# Patient Record
Sex: MALE | Race: WHITE | NOT HISPANIC OR LATINO | Employment: FULL TIME | URBAN - METROPOLITAN AREA
[De-identification: names, ages, dates, MRNs, and addresses within clinical notes are randomized per-mention and may not be internally consistent; named-entity substitution may affect disease eponyms.]

---
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
## 2022-02-11 VITALS
BODY MASS INDEX: 20.49 KG/M2 | TEMPERATURE: 98.1 F | SYSTOLIC BLOOD PRESSURE: 108 MMHG | HEIGHT: 68 IN | WEIGHT: 135.2 LBS | HEART RATE: 64 BPM | DIASTOLIC BLOOD PRESSURE: 60 MMHG

## 2022-02-12 VITALS
TEMPERATURE: 97.2 F | SYSTOLIC BLOOD PRESSURE: 114 MMHG | SYSTOLIC BLOOD PRESSURE: 130 MMHG | DIASTOLIC BLOOD PRESSURE: 80 MMHG | HEART RATE: 80 BPM | BODY MASS INDEX: 21.07 KG/M2 | WEIGHT: 139 LBS | BODY MASS INDEX: 21.59 KG/M2 | HEIGHT: 68 IN | DIASTOLIC BLOOD PRESSURE: 82 MMHG | HEART RATE: 80 BPM | WEIGHT: 142 LBS

## 2022-02-12 VITALS
HEART RATE: 64 BPM | SYSTOLIC BLOOD PRESSURE: 126 MMHG | BODY MASS INDEX: 19.4 KG/M2 | WEIGHT: 123.6 LBS | TEMPERATURE: 97.6 F | DIASTOLIC BLOOD PRESSURE: 72 MMHG | HEIGHT: 67 IN

## 2022-02-12 VITALS
SYSTOLIC BLOOD PRESSURE: 106 MMHG | HEART RATE: 52 BPM | TEMPERATURE: 97.1 F | WEIGHT: 138.8 LBS | OXYGEN SATURATION: 99 % | DIASTOLIC BLOOD PRESSURE: 70 MMHG | BODY MASS INDEX: 21.1 KG/M2

## 2022-02-12 VITALS
WEIGHT: 128 LBS | TEMPERATURE: 98.3 F | WEIGHT: 126.6 LBS | HEIGHT: 68 IN | SYSTOLIC BLOOD PRESSURE: 112 MMHG | HEART RATE: 60 BPM | DIASTOLIC BLOOD PRESSURE: 70 MMHG | DIASTOLIC BLOOD PRESSURE: 74 MMHG | HEIGHT: 68 IN | HEART RATE: 64 BPM | BODY MASS INDEX: 19.19 KG/M2 | SYSTOLIC BLOOD PRESSURE: 100 MMHG | BODY MASS INDEX: 19.4 KG/M2

## 2022-02-12 VITALS
OXYGEN SATURATION: 95 % | WEIGHT: 134.6 LBS | DIASTOLIC BLOOD PRESSURE: 58 MMHG | SYSTOLIC BLOOD PRESSURE: 116 MMHG | HEART RATE: 126 BPM | TEMPERATURE: 99.8 F

## 2022-02-12 VITALS — DIASTOLIC BLOOD PRESSURE: 62 MMHG | SYSTOLIC BLOOD PRESSURE: 116 MMHG | HEART RATE: 76 BPM | TEMPERATURE: 97.8 F

## 2022-02-15 NOTE — PROGRESS NOTES
Chief Complaint    pt here for upper back pain, shoulder pain, says worse with movement, pain started on wednesday, he went to chiro yesturday, no improvment, no known injury  History of Present Illness      Chief complaint as above reviewed and confirmed with patient. and parents.  Pt presents to the clinic with concerns re: upper back, shoulder discomfort.  pain had insidious onset.  It started about 5 days ago. He went to the chiropractor and had temporary improvement but then worsened again.  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  He also lifts up to 40 lbs repeatively.  There was not an injury or incident at work but he points out that he had been off work for 2 weeks due to thumb injury and 1 day prior to onset of pain he had returned to work.  He denies any tingling, numbness or weakness of his arms or legs.  He denies any HA. he has tried OTC naproxen 2 tablets bid.  Has tried ice, no heat.   Review of Systems      Review of systems is negative with the exception of those noted in HPI          Physical Exam   Vitals & Measurements    T: 97.1   F (Tympanic)  HR: 52(Peripheral)  BP: 106/70  SpO2: 99%     WT: 138.8 lb       pt is in no acute distress and appears well      no midline cervical pain to palpation       there is mild pain of the perivertebral muscles of the C spine and upper trapezius with palpation on the L.  Pain also along the medial scapular boarder on the L.      There is pain with AROM in flexion, extension, lateral flexion and rotation B but full range.       muscular strength sensation and DTR are symmetrical and WNL for upper ext B.       no midline T or L spine pain.  no pain with palpation of the lumbar perivertebral  muscles.   Assessment/Plan       Upper back pain (M54.9)        continue naproxen.  will do short course of robaxin for sx.  ice, heat, ROM exercises, PT referral.  prn         Orders:          97[address] office outpatient visit  15 minutes (Charge), Quantity: 1, Upper back pain          Physical Therapy (Request), Priority: Soon, Upper back pain                Orders:         methocarbamol, 1 tab(s) ( 500 mg ), PO, QID, # 28 tab(s), 0 Refill(s), Type: Maintenance, Pharmacy: [id number], 1 tab(s) po qid,x7 day(s), (Ordered)  Patient Information     Name:AFRICA IVAN      Address:      [address] [address]     Sex:Male     YOB: 2000     [phone]     Emergency Contact:WESLEY IVAN     MRN:[id number]     FIN:[id number]     Location:[facility]     Date of Service:06/23/2018      Primary Care Physician:       Harper PANTOJA [place], [phone]      Attending Physician:       Alyson Espinoza PA-C, [phone]  Problem List/Past Medical History    Ongoing     ADHD (Attention Deficit Hyperactivity Disorder)     Closed fracture of right little finger       Comments: per [facility]    Historical     No qualifying data  Procedure/Surgical History     Hernia repair (06/2007)             Comments: right     Tonsillectomy (04/01/2007)        Medications     Robaxin 500 mg oral tablet: 500 mg, 1 tab(s), PO, QID, for 7 day(s), 28 tab(s), 0 Refill(s).          Allergies    sulfa drug  Social History    Smoking Status - 06/23/2018     Never smoker     Tobacco      Current, Household tobacco concerns: No., 08/16/2010  Family History    Alive and well: Mother, Father and Sister.  Immunizations      Vaccine Date Status      Hep A 09/29/2015 Recorded      meningococcal conjugate vaccine 10/15/2014 Recorded      influenza virus vaccine, inactivated 10/15/2014 Recorded      Hep A 10/15/2014 Recorded      tetanus/diphth/pertuss (Tdap) adult/adol 08/19/2013 Recorded      IPV 09/02/2005 Recorded      Hep B 09/02/2005 Recorded      MMR (measles/mumps/rubella) 09/02/2005 Recorded      MMR (measles/mumps/rubella) 09/02/2005 Recorded      hepatitis B pediatric vaccine  09/02/2005 Recorded      DTaP 09/02/2005 Recorded      MMR (measles/mumps/rubella) 10/17/2002 Recorded      MMR (measles/mumps/rubella) 10/17/2002 Recorded      varicella 10/17/2002 Recorded      IPV 07/15/2002 Recorded      pneumococcal (PCV7) 03/06/2002 Recorded      pneumococcal (PCV7) 03/06/2002 Recorded      DTaP 03/06/2002 Recorded      IPV 2001 Recorded      Hep B-Hib 2001 Recorded      Hep B-Hib 2001 Recorded      pneumococcal (PCV7) 2001 Recorded      pneumococcal (PCV7) 2001 Recorded      DTaP 2001 Recorded      IPV 2001 Recorded      Hep B-Hib 2001 Recorded      Hep B-Hib 2001 Recorded      DTaP 2001 Recorded

## 2022-02-15 NOTE — NURSING NOTE
Comprehensive Intake Entered On:  6/24/2020 2:27 PM CDT    Performed On:  6/24/2020 2:22 PM CDT by Heydi Garces CMA               Summary   Chief Complaint :   sore on left middle finger, blister under cut, cut finger last week, possibly cut on piece of metal   Menstrual Status :   N/A   Weight Measured :   139 lb(Converted to: 139 lb 0 oz, 63.05 kg)    Height Measured :   68 in(Converted to: 5 ft 8 in, 172.72 cm)    Body Mass Index :   21.13 kg/m2   Body Surface Area :   1.74 m2   Systolic Blood Pressure :   130 mmHg   Diastolic Blood Pressure :   80 mmHg   Mean Arterial Pressure :   97 mmHg   Peripheral Pulse Rate :   80 bpm   BP Method :   Manual   HR Method :   Manual   Heydi Garces CMA - 6/24/2020 2:22 PM CDT   Health Status   Allergies Verified? :   Yes   Medication History Verified? :   Yes   Immunizations Current :   Yes   Medical History Verified? :   Yes   Pre-Visit Planning Status :   Completed   Tobacco Use? :   Never smoker   Heydi Garces CMA - 6/24/2020 2:22 PM CDT   Consents   Consent for Immunization Exchange :   Consent Granted   Consent for Immunizations to Providers :   Consent Granted   Heydi Garces CMA - 6/24/2020 2:22 PM CDT   Meds / Allergies   (As Of: 6/24/2020 2:27:23 PM CDT)   Allergies (Active)   sulfa drug  Estimated Onset Date:   Unspecified ; Created By:   Noreen Alatorre; Reaction Status:   Active ; Category:   Drug ; Substance:   sulfa drug ; Type:   Allergy ; Updated By:   Noreen Alatorre; Reviewed Date:   6/24/2020 2:25 PM CDT        Medication List   (As Of: 6/24/2020 2:27:23 PM CDT)   Prescription/Discharge Order    Miscellaneous Rx Supply  :   Miscellaneous Rx Supply ; Status:   Processing ; Ordered As Mnemonic:   crutches ; Ordering Provider:   Mookie Acosta MD; Action Display:   Complete ; Catalog Code:   Miscellaneous Rx Supply ; Order Dt/Tm:   6/24/2020 2:25:21 PM CDT            Home Meds    amphetamine-dextroamphetamine  :   amphetamine-dextroamphetamine ; Status:    Documented ; Ordered As Mnemonic:   Adderall XR 30 mg oral capsule, extended release ; Simple Display Line:   30 mg, 1 cap(s), Oral, qam, 0 Refill(s) ; Catalog Code:   amphetamine-dextroamphetamine ; Order Dt/Tm:   4/19/2019 1:45:04 PM CDT            ID Risk Screen   Recent Travel History :   No recent travel   Family Member Travel History :   No recent travel   Other Exposure to Infectious Disease :   Unknown   Heydi Garces CMA - 6/24/2020 2:22 PM CDT

## 2022-02-15 NOTE — PROGRESS NOTES
Patient:   AFRICA IVAN            MRN: [id number]            FIN: [id number]               Age:   17 years     Sex:  Male     :  2000   Associated Diagnoses:   Pneumonia   Author:   Arturo Lange PA-C      Visit Information      Date of Service: 2018 02:19 pm  Performing Location: [facility]  Encounter#: [id number]      Primary Care Provider (PCP):  Jnúior Saleem MD    NPI# [id number]   Visit type:  New symptom.    Source of history:  Self, Medical record.    History limitation:  None.       Chief Complaint   2018 2:23 PM CST    cough, unable to get sleep last night, phlegm x 2 weeks on and off        History of Present Illness             The patient presents with cough.  The cough is described as hacking and productive green sputum.  The cough is episodic, fluctuates in intensity and is worsening.  The cough has lasted for 2 week(s).  The context of the cough: occurred in association with illness.  Associated symptoms consist of nasal congestion, denies fever, denies shortness of breath and denies sore throat.        Review of Systems   Eye:  Negative.    Ear/Nose/Mouth/Throat:  Negative except as documented in history of present illness.    Respiratory:  Negative except as documented in history of present illness.             Health Status   Allergies:    Allergic Reactions (All)  Severity Not Documented  Sulfa drug (No reactions were documented)   Medications:  (Selected)   Prescriptions  Prescribed  Zithromax 250 mg oral tablet: 1 packet(s), PO, Once, Instructions: as directed on package labeling. Two tablets po on day one, then one tablet daily x four days, # 6 tab(s), 0 Refill(s), Type: Soft Stop, Pharmacy: [facility] #[id number], 1 packet(s) po once,Instr:as directed on pac...  Documented Medications  Documented  Adderall 30 mg oral tablet: 1 tab(s) ( 30 mg ), po, daily, 0 Refill(s), Type: Maintenance   Problem list:    All Problems  ADHD (Attention Deficit  Hyperactivity Disorder) / ICD-9-.01 / Confirmed  Closed fracture of right little finger / SNOMED CT 712157252068408 / Confirmed      Histories   Past Medical History:    Active  Closed fracture of right little finger (386664873242476): Onset on 5/26/2016 at 15 years.  Comments:  7/5/2016 CDT 2:48 PM CDT - [facility]  ADHD (Attention Deficit Hyperactivity Disorder) (314.01)   Family History:    Alive and well  Mother (Karina)  Father (Mick)  Sister     Procedure history:    Hernia repair ([id number]) in the month of 6/2007 at 6 Years.  Comments:  6/3/2016 12:19 PM - Shabana Be  right  Tonsillectomy ([id number]) on 4/1/2007 at 6 Years.   Social History:        Tobacco Assessment            Current, Household tobacco concerns: No.        Physical Examination   Vital Signs   1/17/2018 2:23 PM CST Temperature Tympanic 99.8 DegF    Peripheral Pulse Rate 126 bpm  HI    Pulse Site Radial artery    HR Method Manual    Systolic Blood Pressure 116 mmHg    Diastolic Blood Pressure 58 mmHg    Mean Arterial Pressure 77 mmHg    BP Site Left arm    BP Method Manual    Oxygen Saturation 95 %      Measurements from flowsheet : Measurements   1/17/2018 2:23 PM CST Height Measured - Standard 679 in    Weight Measured - Standard 134.6 lb    BSA 5.4 m2    Body Mass Index 0.21 kg/m2    Body Mass Index Percentile 0.00      General:  Alert and oriented, No acute distress.    Eye:  Pupils are equal, round and reactive to light, Extraocular movements are intact, Normal conjunctiva.    HENT:  Normocephalic, Oral mucosa is moist, No pharyngeal erythema.    Neck:  Supple, Non-tender, No lymphadenopathy.    Respiratory:  Lungs are clear to auscultation, Respirations are non-labored, Breath sounds are equal.    Cardiovascular:  Normal rate, Regular rhythm, No murmur.    Psychiatric:  Cooperative, Appropriate mood & affect.       Impression and Plan   Diagnosis     Pneumonia (COR59-RO J18.9).     Patient  Instructions:       Counseled: Patient, Regarding diagnosis, Regarding treatment, Regarding medications, Regarding activity, Verbalized understanding.    Orders     Orders (Selected)   Prescriptions  Prescribed  Zithromax 250 mg oral tablet: 1 packet(s), PO, Once, Instructions: as directed on package labeling. Two tablets po on day one, then one tablet daily x four days, # 6 tab(s), 0 Refill(s), Type: Soft Stop, Pharmacy: [facility] #[id number], 1 packet(s) po once,Instr:as directed on pac....     Take medicine as prescribed, side effects discussed.  Tylenol/ibuprofen for fever and discomfort.  Push fluids.  RTC if not improving in 36-48 hours, prior if concerns as we have discussed.

## 2022-02-15 NOTE — PROGRESS NOTES
Patient:   AFRICA IVAN            MRN: [id number]            FIN: [id number]               Age:   16 years     Sex:  Male     :  2000   Associated Diagnoses:   Conjunctivitis   Author:   Júnior Saleem MD      Chief Complaint   2017 9:31 AM CDT    c/o red, itchy, crusty bilateral eyes x Monday     Chief complaint and symptoms noted above confirmed with patient.      History of Present Illness             The patient presents with eye discharge.  The eye discharge is located in both eyes.  The eye discharge is described as a moderate amount.  The severity of the eye discharge is moderate.  The eye discharge is constant.  The eye discharge has lasted for 3 day(s).  Exacerbating factors consist of none.  Relieving factors consist of none.  Associated symptoms consist of none.        Review of Systems   Constitutional:  Negative.    Eye:  Negative except as documented in history of present illness.    Ear/Nose/Mouth/Throat:  Negative.    Respiratory:  Negative.    Cardiovascular:  Negative.       Health Status   Allergies:    Allergic Reactions (Selected)  Severity Not Documented  Sulfa drug (No reactions were documented)   Medications:  (Selected)   Documented Medications  Documented  Adderall 30 mg oral tablet: 1 tab(s) ( 30 mg ), po, daily, 0 Refill(s), Type: Maintenance   Problem list:    All Problems  ADHD (Attention Deficit Hyperactivity Disorder) / ICD-9-.01 / Confirmed  Closed fracture of right little finger / SNOMED CT 974078137627248 / Confirmed      Histories   Past Medical History:    Active  Closed fracture of right little finger (SNOMED CT 728960930806091): Onset on 2016 at 15 years.  Comments:  2016 CDT 2:48 PM CDT - Barbara Sandoval CMA [facility]  ADHD (Attention Deficit Hyperactivity Disorder) (ICD-9-.01)      Physical Examination   Vital Signs   2017 9:31 AM CDT Temperature Tympanic 97.6 DegF  LOW    Peripheral Pulse Rate 64 bpm    Pulse Site  Radial artery    HR Method Manual    Systolic Blood Pressure 126 mmHg    Diastolic Blood Pressure 72 mmHg    Mean Arterial Pressure 90 mmHg    BP Site Left arm    BP Method Manual      Measurements from flowsheet : Measurements   4/12/2017 9:31 AM CDT Height Measured - Standard 67 in    Weight Measured - Standard 123.6 lb    BSA 1.63 m2    Body Mass Index 19.36 kg/m2    Body Mass Index Percentile 36.07      General:  Alert and oriented, No acute distress.    Eye:  Pupils are equal, round and reactive to light, Extraocular movements are intact.         Conjunctiva: With conjunctivitis.    HENT:  Normocephalic.    Neck:  Supple.    Respiratory:  Lungs are clear to auscultation.       Impression and Plan   Diagnosis     Conjunctivitis (EMU82-ZW H10.33).     Course:  Worsening.    Orders     Orders   Pharmacy:  ciprofloxacin 0.3% ophthalmic solution (Prescribe): 2 drop(s), Both eyes, q4hr, x 7 day(s), # 5 mL, 0 Refill(s), Type: Maintenance, Pharmacy: [facility] #[id number], 2 drop(s) both eyes q4 hrs,x7 day(s).     Orders     F/U  if not improving, sooner if getting worse.

## 2022-02-15 NOTE — NURSING NOTE
Call Adis Collins  to get Verbal permission to see and treat patient for wrist pain 4/19/19 . Ok to do X-ray if CHT finds necessary.

## 2022-02-15 NOTE — PROGRESS NOTES
Chief Complaint    Concerns with sores behind both knees and L axillary - noticed about 2-3 weeks. Had been getting better but not going away. Axillary sore has drained and getting worse. Has [place] last weekend.  History of Present Illness      Patient is an 19-year-old male who comes in with concerns about a sore in his left armpit which is getting worse.       On June 24, which is 2-1/2 to 3 weeks ago, the patient was seen for an infection in his left middle finger.  He had cut his finger and was placed on an antibiotic, dicloxacillin.       Shortly after that he noticed small bumps on the back of his knees and now has 3 bumps in his left armpit.  1 of the areas in his left armpit is increasingly painful and is itching.  It did get some clear drainage out of it recently.  He used the triple antibiotic cream on it a couple of times.       He swam in a lake about a week ago.       No hot tub exposure.       No fevers, joint aches.       No lesions in the groin area.       Patient also reports that he has some small bumps on his skin of his lower abdomen.  He saw a dermatologist and was placed on an oral steroid.  It worked for the first couple doses but then did not really seem to work anymore.  He has completed the steroid pills and is wondering if he should do more steroids or if there is something else to do for the bumps on his skin.  They are not itchy.  Review of Systems      Negative except as listed in HPI.  Physical Exam   Vitals & Measurements    T: 97.2   F (Tympanic)  HR: 80(Peripheral)  BP: 114/82     WT: 142 lb       Vitals are noted and within normal limits.      In general he is alert, oriented, and in no acute distress.       Left axilla with 2 areas of swelling and erythema.  They are oval-shaped and mildly swollen.  One area has a small pustule in the center.  They are approximately 1 cm by half a centimeter in size.       No lesions currently behind left knee.  One lesion behind  the right knee which is approximately half a centimeter by half a centimeter and erythematous.       Lower abdomen with multiple small 1 mm erythematous areas which appear to be at the base of hair follicles.  Assessment/Plan       Dermatitis (L30.9)         for the dermatitis on his skin, advised that we would likely not do oral steroid long term and that he should follow up with his dermatologist for further guidance.         recommended taking vitamin D 2000 IU daily to help overall with his skin and immune system given both of his complaints today.  offered testing for his vitamin D level but he felt comfortable just taking a safe adult daily dose.                Skin infection (L08.9)         For the axillary infection we will go with an oral antibiotic.  I chose cephalexin 3 times a day for 10 days for the skin infection because he is allergic to sulfa antibiotics.  I did recommend that he take a probiotic like Culturelle or something that includes lactobacillus and bifida back to her once daily for at least 3 weeks once he has completed the antibiotics.         Ordered:          cephalexin, = 1 cap(s) ( 500 mg ), Oral, tid, x 10 day(s), # 30 cap(s), 0 Refill(s), Type: Acute, Pharmacy: [facility] , 1 cap(s) Oral tid,x10 day(s), 68, in, 06/24/20 14:22:00 CDT, Height Measured, 142, lb, 07/10/20 9:48:00 CDT, Weight Me..., (Ordered)           Patient Information     Name:AFRICA IVAN      Address:      [address] [id number]     Sex:Male     YOB: 2000     [phone]     Emergency Contact:WESLEY IVAN     MRN:[id number]     FIN:[id number]     Location:[facility]     Date of Service:07/10/2020      Primary Care Physician:       Júnior Saleem MD, [phone]      Attending Physician:       Lucinda Silva MD, [phone]  Problem List/Past Medical History    Ongoing     ADHD (Attention Deficit Hyperactivity  Disorder)     Closed fracture of right little finger       Comments: per [facility]    Historical     No qualifying data  Procedure/Surgical History     Hernia repair (06.2007)      Comments: right.     Tonsillectomy (04/01/2007)  Medications    Adderall XR 30 mg oral capsule, extended release, 30 mg= 1 cap(s), Oral, qam,   Still taking, not as prescribed: takes every once and a while    cephalexin 500 mg oral capsule, 500 mg= 1 cap(s), Oral, tid  Allergies    sulfa drug  Social History    Smoking Status - 07/10/2020     Never smoker     Tobacco      Current, Household tobacco concerns: No., 08/16/2010  Family History    Alive and well: Mother, Father and Sister.  Immunizations      Vaccine Date Status          human papillomavirus vaccine 07/21/2017 Recorded          Hep A 09/29/2015 Recorded          Hep A 10/15/2014 Recorded          meningococcal conjugate vaccine 10/15/2014 Recorded          influenza virus vaccine, inactivated 10/15/2014 Recorded          tetanus/diphth/pertuss (Tdap) adult/adol 08/19/2013 Recorded          varicella 08/19/2013 Recorded          Hep B 09/02/2005 Recorded          MMR (measles/mumps/rubella) 09/02/2005 Recorded          MMR (measles/mumps/rubella) 09/02/2005 Recorded          IPV 09/02/2005 Recorded          DTaP 09/02/2005 Recorded          hepatitis B pediatric vaccine 09/02/2005 Recorded          MMR (measles/mumps/rubella) 10/17/2002 Recorded          MMR (measles/mumps/rubella) 10/17/2002 Recorded          varicella 10/17/2002 Recorded          IPV 07/15/2002 Recorded          DTaP 03/06/2002 Recorded          pneumococcal (PCV7) 03/06/2002 Recorded          pneumococcal (PCV7) 03/06/2002 Recorded          Hep B-Hib 2001 Recorded          Hep B-Hib 2001 Recorded          IPV 2001 Recorded          DTaP 2001 Recorded          pneumococcal (PCV7) 2001 Recorded          pneumococcal (PCV7) 2001 Recorded          Hep B-Hib 2001  Recorded          Hep B-Hib 2001 Recorded          IPV 2001 Recorded          DTaP 2001 Recorded

## 2022-02-15 NOTE — NURSING NOTE
Comprehensive Intake Entered On:  7/10/2020 9:53 AM CDT    Performed On:  7/10/2020 9:48 AM CDT by Rosie Lira CMA               Summary   Chief Complaint :   Concerns with sores behind both knees and L axillary - noticed about 2-3 weeks. Had been getting better but not going away. Axillary sore has drained and getting worse. Has been swimming [place] last weekend.    Menstrual Status :   N/A   Weight Measured :   142 lb(Converted to: 142 lb 0 oz, 64.41 kg)    Systolic Blood Pressure :   114 mmHg   Diastolic Blood Pressure :   82 mmHg (HI)    Mean Arterial Pressure :   93 mmHg   Peripheral Pulse Rate :   80 bpm   BP Site :   Right arm   Pulse Site :   Radial artery   BP Method :   Manual   HR Method :   Manual   Temperature Tympanic :   97.2 DegF(Converted to: 36.2 DegC)  (LOW)    Rosie Lira CMA - 7/10/2020 9:48 AM CDT   Health Status   Allergies Verified? :   Yes   Medication History Verified? :   Yes   Immunizations Current :   Yes   Pre-Visit Planning Status :   Not completed   Tobacco Use? :   Never smoker   Rosie Lira CMA - 7/10/2020 9:48 AM CDT   Meds / Allergies   (As Of: 7/10/2020 9:53:43 AM CDT)   Allergies (Active)   sulfa drug  Estimated Onset Date:   Unspecified ; Created By:   Noreen Alatorre; Reaction Status:   Active ; Category:   Drug ; Substance:   sulfa drug ; Type:   Allergy ; Updated By:   Noreen Alatorre; Reviewed Date:   6/24/2020 2:32 PM CDT        Medication List   (As Of: 7/10/2020 9:53:43 AM CDT)   Home Meds    amphetamine-dextroamphetamine  :   amphetamine-dextroamphetamine ; Status:   Documented ; Ordered As Mnemonic:   Adderall XR 30 mg oral capsule, extended release ; Simple Display Line:   30 mg, 1 cap(s), Oral, qam, 0 Refill(s) ; Catalog Code:   amphetamine-dextroamphetamine ; Order Dt/Tm:   4/19/2019 1:45:04 PM CDT            ID Risk Screen   Recent Travel History :   No recent travel   Family Member Travel History :   No recent travel   Other Exposure to Infectious  Disease :   Unknown   Rosie Lira CMA - 7/10/2020 9:48 AM CDT

## 2022-02-15 NOTE — PROGRESS NOTES
Patient:   AFRICA IVAN            MRN: [id number]            FIN: [id number]               Age:   17 years     Sex:  Male     :  2000   Associated Diagnoses:   None   Author:   Arturo Lange PA-C      Visit Information      Date of Service: 2018 03:40 pm  Performing Location: [facility]  Encounter#: [id number]      Primary Care Provider (PCP):  Harper PANTOJA, Júnior    NPI# [id number]      Referring Provider:  Saw Bailey MD    NPI# [id number]      Chief Complaint   2018 3:47 PM CDT    f/u L thumb      History of Present Illness   [place] presents to clinic today for a follow up regarding left thumb pain.  Patient presented last week after his left thumb came in contact with a basketball, he assumed he jammed it due to pain.  Xrays of the thumb last week showed no acute fracture.  Patient sent home with brace and recommended Tylenol & Ibuprofen regimine for pain control.  Today patient presents with slight pain (4/10) but describes shooting pain in left wrist and tingling sensation in left thumb.  He says the pain had gone away over the week but was most noticeable today.  Describes pain is worst when carrying his books with left hand and he describes difficulty driving.  History provided by patient.  Mom present for the appointment today as well.          Review of Systems   Constitutional:  Negative.    Musculoskeletal:  Joint pain, left thumb, pain radiates into wrist.    Integumentary:  Negative.       Health Status   Allergies:    Allergic Reactions (Selected)  Severity Not Documented  Sulfa drug (No reactions were documented)   Medications:  (Selected)   Documented Medications  Documented  Adderall 30 mg oral tablet: 1 tab(s) ( 30 mg ), po, daily, 0 Refill(s), Type: Maintenance,    Medications          *denotes recorded medication          *Adderall 30 mg oral tablet: 30 mg, 1 tab(s), po, daily, 0 Refill(s).     Problem list:    All Problems  ADHD (Attention Deficit  Hyperactivity Disorder) / ICD-9-.01 / Confirmed  Closed fracture of right little finger / SNOMED CT 895425197230340 / Confirmed      Histories   Past Medical History:    Active  Closed fracture of right little finger ([id number]): Onset on 5/26/2016 at 15 years.  Comments:  7/5/2016 CDT 2:48 PM CDT - Jaime MEDEIROS Barbaratracy ferrari [facility]  ADHD (Attention Deficit Hyperactivity Disorder) (314.01)   Family History:    Alive and well  Mother (Karina)  Father (Mick)  Sister     Procedure history:    Hernia repair ([id number]) in the month of 6/2007 at 6 Years.  Comments:  6/3/2016 12:19 PM - Shabana Be  right  Tonsillectomy ([id number]) on 4/1/2007 at 6 Years.   Social History:        Tobacco Assessment            Current, Household tobacco concerns: No.        Physical Examination   Vital Signs   5/23/2018 3:47 PM CDT Temperature Tympanic 98.3 DegF    Peripheral Pulse Rate 64 bpm    Pulse Site Radial artery    HR Method Manual    Systolic Blood Pressure 112 mmHg    Diastolic Blood Pressure 74 mmHg    Mean Arterial Pressure 87 mmHg    BP Site Left arm    BP Method Manual      Measurements from flowsheet : Measurements   5/23/2018 3:47 PM CDT Height Measured - Standard 68 in    Weight Measured - Standard 128 lb    BSA 1.67 m2    Body Mass Index 19.46 kg/m2    Body Mass Index Percentile 26.66      General:  Alert and oriented, No acute distress.    Cardiovascular:          Arterial pulses: Left, Radial, 2+.         Capillary refill: Left, Upper extremity, Within normal limits.    Musculoskeletal:       Upper extremity exam: Fingers ( Left, First finger, Tenderness, Range of motion ( Flexion, Restricted by pain ) ).    Integumentary:  Warm, Dry, No rash.       Impression and Plan   Plan:  Discussed two options with patient.  Option 1: Observation x1 week with activity restriction and NSAIDS & Tylenol for pain.  Option 2: MRI.  Patient and mother in agreement to observe the thumb for one week and discontinue  use of brace prior to getting MRI.  .    Note composed by PANDA Mena. History confirmed and exam performed by Arturo Lange PA-C.

## 2022-02-15 NOTE — PROGRESS NOTES
Patient:   AFRICA IVAN            MRN: [id number]            FIN: [id number]               Age:   17 years     Sex:  Male     :  2000   Associated Diagnoses:   Pain of left thumb   Author:   Arturo Lange PA-C      Visit Information      Date of Service: 2018 12:59 pm  Performing Location: [facility]  Encounter#: [id number]      Primary Care Provider (PCP):  Júnior Saleem MD    NPI# [id number]      Referring Provider:  Arturo Lange PA-C    NPI# [id number]      Chief Complaint   2018 1:03 PM CDT    Left thumb pain, swollen- jammed in gym yesterday with basketball      History of Present Illness   [place] presents to clinic today for evaluation of left thumb pain.  While playing basketball yesterday, a ball came flying across the gym so he threw his hand in front of his face for protection and his thumb came in contact with the ball.  Patient believes he may have jammed his thumb.  He immediately felt pain to his left thumb but denies any further injury.  Last evening he tried ice but it offered no relief. This morning he took 1 Aleve which offered no relief.   Patient denies pain over scaphoid, at wrist or at elbow.         Review of Systems   Constitutional:  Negative.    Eye:  Negative.    Ear/Nose/Mouth/Throat:  Negative.    Respiratory:  Negative.    Cardiovascular:  Negative.    Musculoskeletal:  Joint pain, tenderness at base of left thumb, No trauma.       Health Status   Allergies:    Allergic Reactions (Selected)  Severity Not Documented  Sulfa drug (No reactions were documented)   Medications:  (Selected)   Documented Medications  Documented  Adderall 30 mg oral tablet: 1 tab(s) ( 30 mg ), po, daily, 0 Refill(s), Type: Maintenance,    Medications          *denotes recorded medication          *Adderall 30 mg oral tablet: 30 mg, 1 tab(s), po, daily, 0 Refill(s).     Problem list:    All Problems  ADHD (Attention Deficit Hyperactivity Disorder) / ICD-9-.01 /  Confirmed  Closed fracture of right little finger / SNOMED CT 126780367403606 / Confirmed      Histories   Past Medical History:    Active  Closed fracture of right little finger ([id number]): Onset on 5/26/2016 at 15 years.  Comments:  7/5/2016 CDT 2:48 PM CDT - Barbara Sandoval CMA  [facility]  ADHD (Attention Deficit Hyperactivity Disorder) (314.01)   Family History:    Alive and well  Mother (Karina)  Father (Mick)  Sister     Procedure history:    Hernia repair ([id number]) in the month of 6/2007 at 6 Years.  Comments:  6/3/2016 12:19 PM - Shabana Be  right  Tonsillectomy ([id number]) on 4/1/2007 at 6 Years.   Social History:        Tobacco Assessment            Current, Household tobacco concerns: No.        Physical Examination   Vital Signs   5/16/2018 1:03 PM CDT Peripheral Pulse Rate 60 bpm    Pulse Site Radial artery    HR Method Manual    Systolic Blood Pressure 100 mmHg    Diastolic Blood Pressure 70 mmHg    Mean Arterial Pressure 80 mmHg    BP Site Right arm    BP Method Manual      Measurements from flowsheet : Measurements   5/16/2018 1:03 PM CDT Height Measured - Standard 68 in    Weight Measured - Standard 126.6 lb    BSA 1.66 m2    Body Mass Index 19.25 kg/m2    Body Mass Index Percentile 23.61      General:  Alert and oriented, No acute distress.    Cardiovascular:          Arterial pulses: Left, Radial, 2+.         Capillary refill: Left, Upper extremity, Within normal limits.    Musculoskeletal:       Upper extremity exam: Fingers ( Left, First finger, Metacarpal phalangeal, Tenderness, Range of motion, limited flexion ).       Review / Management   Radiology results   X-ray, Xray appears negative for acute fracture or dislocation.  Will contact patient and family if final report shows fracture/dislocation.      Impression and Plan   Diagnosis     Pain of left thumb (HPI46-MJ M79.645).     Plan:  Continue splint for  1 week(s).    Patient Instructions:       Counseled: For pain  control, patient instructed to take Aleve 200mg every 12 hours and Tylenol 1000mg every 6hrs up to 4g per day.  Ice every couple hours.  Return in 1 week for re-evaluation.  Discussed possible MRI if pain not improved.  .    Called and discussed treatment and plan with father. Note composed by PANDA Mena. History confirmed and exam performed by Arturo Lange PA-C.

## 2022-02-15 NOTE — PROGRESS NOTES
Patient:   AFRICA IVAN            MRN: [id number]            FIN: [id number]               Age:   19 years     Sex:  Male     :  2000   Associated Diagnoses:   Sprain of left foot; Left foot pain   Author:   Mookie Acosta MD      Visit Information      Date of Service: 2019 05:02 pm  Performing Location: [facility]  Encounter#: [id number]      Primary Care Provider (PCP):  Júnior Saleem MD    NPI# [id number]      Referring Provider:  Mookie Acosta MD    NPI# [id number]      Chief Complaint   2019 5:19 PM CDT    Left ankle pain      History of Present Illness             The patient presents with pain.  The symptom(s) is described as pain and swelling.  The location of symptom(s) is the left and foot.  The severity of the symptom(s) is moderate.  The timing/course of symptom(s) is constant.  The symptom(s) has lasted for 12 hour(s).  Radiation of pain: none.  The context of the symptom(s): occurred during sports and no overt trauma to the area .  Exacerbating factors consist of movement and walking.  Relieving factors consist of immobilization.  Associated symptoms consist of decreased range of motion, unable to bear weight and  these are secondary to pain.        Review of Systems   Negative, except as stated in HPI       Health Status   Allergies:    Allergic Reactions (Selected)  Severity Not Documented  Sulfa drug (No reactions were documented)   Medications:  (Selected)   Prescriptions  Prescribed  naproxen 375 mg oral tablet: = 1 tab(s) ( 375 mg ), PO, BID, PRN: for pain, # 60 tab(s), 1 Refill(s), Type: Acute, Pharmacy: [id number], 1 tab(s) Oral bid,PRN:for pain  Documented Medications  Documented  Adderall XR 30 mg oral capsule, extended release: = 1 cap(s) ( 30 mg ), Oral, qam, 0 Refill(s), Type: Maintenance,    Medications          *denotes recorded medication          *Adderall XR 30 mg oral capsule, extended release: 30 mg, 1 cap(s), Oral, qam,  0 Refill(s).          naproxen 375 mg oral tablet: 375 mg, 1 tab(s), PO, BID, PRN: for pain, 60 tab(s), 1 Refill(s).       Problem list:    All Problems  Closed fracture of right little finger / SNOMED CT 782611186414781 / Confirmed  ADHD (Attention Deficit Hyperactivity Disorder) / ICD-9-.01 / Confirmed      Histories   Past Medical History:    Active  Closed fracture of right little finger ([id number]): Onset on 5/26/2016 at 15 years.  Comments:  7/5/2016 CDT 2:48 PM CDT - Barbara Sandoval CMA  [facility]  ADHD (Attention Deficit Hyperactivity Disorder) (314.01)   Family History:    Alive and well  Mother (Karina)  Father (Mick)  Sister     Procedure history:    Hernia repair ([id number]) in the month of 6/2007 at 6 Years.  Comments:  6/3/2016 12:19 PM CDT - Shabana Be  right  Tonsillectomy ([id number]) on 4/1/2007 at 6 Years.      Physical Examination   Vital Signs   9/24/2019 5:19 PM CDT Temperature Tympanic 97.8 DegF  LOW    Peripheral Pulse Rate 76 bpm    Pulse Site Radial artery    HR Method Manual    Systolic Blood Pressure 116 mmHg    Diastolic Blood Pressure 62 mmHg    Mean Arterial Pressure 80 mmHg    BP Site Left arm    BP Method Manual      Measurements from flowsheet : Measurements   9/24/2019 5:19 PM CDT    Ht/Wt Measurement Refused by Patient?     Yes     Musculoskeletal:       Lower extremity exam: Foot ( Left, Swelling, Tenderness, Pain, No numbness, No tingling, Range of motion ( Restricted by pain ) ).       Review / Management   Radiology results   X-ray, Interpretation:  no fracture seen by me, Radiology review pending. Will contact if any discrepency from prelim reading      Impression and Plan   Diagnosis     Sprain of left foot (XES42-XP S93.602A).     Left foot pain (JML99-MZ M79.672).     Plan:  Rest, Ice, Compression and Elevation, Supportive cares: NSAIDs, rest, reduced weight bearing   Patient to call or return to clinic if symptoms worsen or fail to improve. .     Orders     Orders (Selected)   Outpatient Orders  Ordered  XR Foot Min 3 Views Left (Request): Left foot pain  Prescriptions  Prescribed  crutches: crutches, See Instructions, Instructions: use crutches to stay off left foot until symptoms improve in 1-2 weeks, Supply, # 2 EA, 0 Refill(s), Type: Maintenance.       Patient was seen with student Shavon Hairston, MS4 and history and exam confirmed personally by me. Agree that documentation reflects findings and plan. I completed medical decision making after discussion with student and with patient.  Mookie Acosta MD

## 2022-02-15 NOTE — NURSING NOTE
Comprehensive Intake Entered On:  9/24/2019 5:23 PM CDT    Performed On:  9/24/2019 5:19 PM CDT by Nettie Robbins MA               Summary   Chief Complaint :   Left ankle pain    Menstrual Status :   N/A   Ht/Wt Measurement Refused by Patient? :   Yes   Systolic Blood Pressure :   116 mmHg   Diastolic Blood Pressure :   62 mmHg   Mean Arterial Pressure :   80 mmHg   Peripheral Pulse Rate :   76 bpm   BP Site :   Left arm   Pulse Site :   Radial artery   BP Method :   Manual   HR Method :   Manual   Temperature Tympanic :   97.8 DegF(Converted to: 36.6 DegC)  (LOW)    Nettie Robbins MA - 9/24/2019 5:19 PM CDT   Health Status   Allergies Verified? :   Yes   Medication History Verified? :   Yes   Immunizations Current :   Yes   Medical History Verified? :   Yes   Pre-Visit Planning Status :   Completed   Tobacco Use? :   Never smoker   Nettie Robbins MA - 9/24/2019 5:19 PM CDT   Consents   Consent for Immunization Exchange :   Consent Granted   Consent for Immunizations to Providers :   Consent Granted   Nettie Robbins MA - 9/24/2019 5:19 PM CDT   Meds / Allergies   (As Of: 9/24/2019 5:23:16 PM CDT)   Allergies (Active)   sulfa drug  Estimated Onset Date:   Unspecified ; Created By:   Noreen Alatorre; Reaction Status:   Active ; Category:   Drug ; Substance:   sulfa drug ; Type:   Allergy ; Updated By:   Noreen Alatorre; Reviewed Date:   9/24/2019 5:21 PM CDT        Medication List   (As Of: 9/24/2019 5:23:16 PM CDT)   Prescription/Discharge Order    naproxen  :   naproxen ; Status:   Prescribed ; Ordered As Mnemonic:   naproxen 375 mg oral tablet ; Simple Display Line:   375 mg, 1 tab(s), PO, BID, PRN: for pain, 60 tab(s), 1 Refill(s) ; Ordering Provider:   Júnior Saleem MD; Catalog Code:   naproxen ; Order Dt/Tm:   4/19/2019 2:07:03 PM            Home Meds    amphetamine-dextroamphetamine  :   amphetamine-dextroamphetamine ; Status:   Documented ; Ordered As Mnemonic:   Adderall XR 30 mg oral capsule,  extended release ; Simple Display Line:   30 mg, 1 cap(s), Oral, qam, 0 Refill(s) ; Catalog Code:   amphetamine-dextroamphetamine ; Order Dt/Tm:   4/19/2019 1:45:04 PM

## 2022-02-15 NOTE — PROGRESS NOTES
Patient:   AFRICA IVAN            MRN: [id number]            FIN: [id number]               Age:   19 years     Sex:  Male     :  2000   Associated Diagnoses:   Cellulitis of left middle finger   Author:   Arturo Lange PA-C      Report Summary   Diagnosis  Cellulitis of left middle finger (LLQ42-NZ L03.012).  Patient InstructionsOrders   Visit Information      Date of Service: 2020 02:20 pm  Performing Location: [facility]  Encounter#: [id number]      Primary Care Provider (PCP):  Júnior Saleem MD    NPI# [id number]   Visit type:  General concerns.    Accompanied by:  No one.    Source of history:  Self, Medical record.    Referral source:  Self.    History limitation:  None.       Chief Complaint   2020 2:22 PM CDT    sore on left middle finger, blister under cut, cut finger last week, possibly cut on piece of metal        History of Present Illness             The patient presents with finger pain.  The location of pain is the left and middle finger.  The pain is described as aching.  The severity of the pain is moderate.  The timing/course of the pain is episodic, fluctuates in intensity and is improving.  The pain has lasted for 1 week(s).  Cut finger last week. Unsure if with  or cardboard. Did develop a blister. He opened this week and was able to express some purulent material. Doing better now, but wants it checked. No fevers. Tetanus up to date. Good ROM.        Review of Systems   Constitutional:  Negative.    Musculoskeletal:  Negative except as documented in history of present illness.    Integumentary:  Negative except as documented in history of present illness.    Neurologic:  Negative.       Health Status   Allergies:    Allergic Reactions (Selected)  Severity Not Documented  Sulfa drug (No reactions were documented)   Medications:  (Selected)   Documented Medications  Documented  Adderall XR 30 mg oral capsule, extended release: = 1 cap(s) ( 30  mg ), Oral, qam, 0 Refill(s), Type: Maintenance   Problem list:    All Problems  Closed fracture of right little finger / SNOMED CT [id number] / Confirmed  ADHD (Attention Deficit Hyperactivity Disorder) / ICD-9-.01 / Confirmed      Histories   Past Medical History:    Active  Closed fracture of right little finger ([id number]): Onset on 5/26/2016 at 15 years.  Comments:  7/5/2016 CDT 2:48 PM CDT - Barbara Sandoval CMA  [facility]  ADHD (Attention Deficit Hyperactivity Disorder) (314.01)   Family History:    Alive and well  Mother (Karina)  Father (Mick)  Sister     Procedure history:    Hernia repair ([id number]) in the month of 6/2007 at 6 Years.  Comments:  6/3/2016 12:19 PM CDT - Shabana Be  right  Tonsillectomy (475742065) on 4/1/2007 at 6 Years.      Physical Examination   Vital Signs   6/24/2020 2:22 PM CDT Peripheral Pulse Rate 80 bpm    HR Method Manual    Systolic Blood Pressure 130 mmHg    Diastolic Blood Pressure 80 mmHg    Mean Arterial Pressure 97 mmHg    BP Method Manual      Measurements from flowsheet : Measurements   6/24/2020 2:22 PM CDT Height Measured - Standard 68 in    Weight Measured - Standard 139 lb    BSA 1.74 m2    Body Mass Index 21.13 kg/m2    Body Mass Index Percentile 32.69    Height/Length Z-score -13.74    Weight Percentile 99.90    Weight Z-score 3.10    BMI Z-score -0.45      Cardiovascular:       Capillary refill: Left, Upper extremity, Within normal limits.    Musculoskeletal:  Normal range of motion, Normal strength.    Integumentary:  No rash, Superficial abrasion over pad of left middle finger. Mild erythema surrounding the area. No drainage now. Was unable to express anything. .    Neurologic:  Normal sensory, Normal motor function, No focal deficits.       Impression and Plan   Diagnosis     Cellulitis of left middle finger (BEH27-NT L03.012).     Patient Instructions:       Counseled: Patient, Regarding diagnosis, Regarding medications,  Verbalized understanding.    Orders     Orders (Selected)   Prescriptions  Prescribed  dicloxacillin 500 mg oral capsule: = 1 cap(s) ( 500 mg ), Oral, tid, x 7 day(s), # 21 cap(s), 0 Refill(s), Type: Acute, Pharmacy: [facility] , 1 cap(s) Oral tid,x7 day(s), 68, in, 06/24/20 14:22:00 CDT, Height Measured, 139, lb, 06/24/20 14:22:00 CDT, Weight Brittany....     Local cares. Should continue to improve and not worsen or will recheck.

## 2022-02-15 NOTE — NURSING NOTE
Comprehensive Intake Entered On:  4/19/2019 1:46 PM CDT    Performed On:  4/19/2019 1:41 PM CDT by Nettie Robbins MA               Summary   Chief Complaint :   Pain left in wrist    Menstrual Status :   N/A   Weight Measured :   135.2 lb(Converted to: 135 lb 3 oz, 61.33 kg)    Height Measured :   68 in(Converted to: 5 ft 8 in, 172.72 cm)    Body Mass Index :   20.55 kg/m2   Body Surface Area :   1.71 m2   Systolic Blood Pressure :   108 mmHg   Diastolic Blood Pressure :   60 mmHg   Mean Arterial Pressure :   76 mmHg   Peripheral Pulse Rate :   64 bpm   BP Site :   Left arm   Pulse Site :   Radial artery   BP Method :   Manual   HR Method :   Manual   Temperature Tympanic :   98.1 DegF(Converted to: 36.7 DegC)    Nettie Robbins MA - 4/19/2019 1:41 PM CDT   Health Status   Allergies Verified? :   Yes   Medication History Verified? :   Yes   Immunizations Current :   Yes   Medical History Verified? :   Yes   Pre-Visit Planning Status :   Completed   Tobacco Use? :   Never smoker   Nettie Robbins MA - 4/19/2019 1:41 PM CDT   Consents   Consent for Immunization Exchange :   Consent Granted   Consent for Immunizations to Providers :   Consent Granted   Nettie Robbins MA - 4/19/2019 1:41 PM CDT   Meds / Allergies   (As Of: 4/19/2019 1:46:45 PM CDT)   Allergies (Active)   sulfa drug  Estimated Onset Date:   Unspecified ; Created By:   Noreen Alatorre; Reaction Status:   Active ; Category:   Drug ; Substance:   sulfa drug ; Type:   Allergy ; Updated By:   Noreen Alaotrre; Reviewed Date:   4/19/2019 1:45 PM CDT        Medication List   (As Of: 4/19/2019 1:46:45 PM CDT)   Home Meds    amphetamine-dextroamphetamine  :   amphetamine-dextroamphetamine ; Status:   Documented ; Ordered As Mnemonic:   Adderall XR 30 mg oral capsule, extended release ; Simple Display Line:   30 mg, 1 cap(s), Oral, qam, 0 Refill(s) ; Catalog Code:   amphetamine-dextroamphetamine ; Order Dt/Tm:   4/19/2019 1:45:04 PM

## 2022-02-15 NOTE — PROGRESS NOTES
Patient:   AFRICA IVAN            MRN: [id number]            FIN: [id number]               Age:   18 years     Sex:  Male     :  2000   Associated Diagnoses:   Arm pain   Author:   Júnior Saleem MD      Chief Complaint   2019 1:41 PM CDT    Pain left in wrist     Chief complaint and symptoms noted above confirmed with patient.      History of Present Illness             The patient presents with upper extremity pain.  The location of pain is the left and forearm(s).  The pain is characterized by aching.  The severity of the pain is moderate.  The timing/course of the pain fluctuates in intensity.  The pain occurred 1 week(s).  Exacerbating factors consist of movement.  Relieving factors consist of none.  Associated symptoms consist of denies joint stiffness and denies loss of function.  Prior treatment consists of Aleve.        Review of Systems   Constitutional:  Negative.    Musculoskeletal:  Negative except as documented in history of present illness.       Health Status   Allergies:    Allergies reviewed.   Medications:    Medications reviewed.   Problem list:    Problem list reviewed.      Histories   Past Medical History:    Past medical history reviewed.   Family History:    Family history reviewed.   Procedure history:    Procedure history reviewed.      Physical Examination   Vital Signs   2019 1:41 PM CDT Temperature Tympanic 98.1 DegF    Peripheral Pulse Rate 64 bpm    Pulse Site Radial artery    HR Method Manual    Systolic Blood Pressure 108 mmHg    Diastolic Blood Pressure 60 mmHg    Mean Arterial Pressure 76 mmHg    BP Site Left arm    BP Method Manual      Measurements from flowsheet : Measurements   2019 1:41 PM CDT Height Measured - Standard 68 in    Weight Measured - Standard 135.2 lb    BSA 1.71 m2    Body Mass Index 20.55 kg/m2    Body Mass Index Percentile 34.26      General:  Alert and oriented, No acute distress.    Musculoskeletal:       Upper extremity exam:  Forearm ( Left, Lateral, Tenderness, Pain, No numbness, No tingling, Strength  5 /5, Normal range of motion ).       Review / Management   Radiology results   X-ray, normal forearm      Impression and Plan   Diagnosis     Arm pain (ZQU96-UY M79.632).     Course:  Worsening.    Orders     Orders   Requests (Radiology):  XR Forearm Left (Request) (Order): Arm pain.     Orders   Pharmacy:  naproxen 375 mg oral tablet (Prescribe): = 1 tab(s) ( 375 mg ), PO, BID, PRN: for pain, # 60 tab(s), 1 Refill(s), Type: Acute, Pharmacy: [id number], 1 tab(s) Oral bid,PRN:for pain.     Alternate jobs at work..

## 2023-02-26 ENCOUNTER — OFFICE VISIT (OUTPATIENT)
Dept: URGENT CARE | Facility: URGENT CARE | Age: 23
End: 2023-02-26
Payer: COMMERCIAL

## 2023-02-26 VITALS
WEIGHT: 156.1 LBS | TEMPERATURE: 97 F | DIASTOLIC BLOOD PRESSURE: 72 MMHG | RESPIRATION RATE: 14 BRPM | SYSTOLIC BLOOD PRESSURE: 108 MMHG | BODY MASS INDEX: 23.73 KG/M2 | HEART RATE: 62 BPM | OXYGEN SATURATION: 98 %

## 2023-02-26 DIAGNOSIS — K29.00 ACUTE GASTRITIS WITHOUT HEMORRHAGE, UNSPECIFIED GASTRITIS TYPE: Primary | ICD-10-CM

## 2023-02-26 DIAGNOSIS — R10.13 EPIGASTRIC PAIN: ICD-10-CM

## 2023-02-26 LAB
ANION GAP SERPL CALCULATED.3IONS-SCNC: 12 MMOL/L (ref 7–15)
BASOPHILS # BLD AUTO: 0 10E3/UL (ref 0–0.2)
BASOPHILS NFR BLD AUTO: 1 %
BUN SERPL-MCNC: 10.2 MG/DL (ref 6–20)
CALCIUM SERPL-MCNC: 10.1 MG/DL (ref 8.6–10)
CHLORIDE SERPL-SCNC: 101 MMOL/L (ref 98–107)
CREAT SERPL-MCNC: 1.14 MG/DL (ref 0.67–1.17)
DEPRECATED HCO3 PLAS-SCNC: 26 MMOL/L (ref 22–29)
EOSINOPHIL # BLD AUTO: 0.1 10E3/UL (ref 0–0.7)
EOSINOPHIL NFR BLD AUTO: 2 %
ERYTHROCYTE [DISTWIDTH] IN BLOOD BY AUTOMATED COUNT: 12.4 % (ref 10–15)
GFR SERPL CREATININE-BSD FRML MDRD: >90 ML/MIN/1.73M2
GLUCOSE SERPL-MCNC: 99 MG/DL (ref 70–99)
HCT VFR BLD AUTO: 48.7 % (ref 40–53)
HGB BLD-MCNC: 16.3 G/DL (ref 13.3–17.7)
IMM GRANULOCYTES # BLD: 0 10E3/UL
IMM GRANULOCYTES NFR BLD: 0 %
LYMPHOCYTES # BLD AUTO: 1.6 10E3/UL (ref 0.8–5.3)
LYMPHOCYTES NFR BLD AUTO: 30 %
MCH RBC QN AUTO: 29.2 PG (ref 26.5–33)
MCHC RBC AUTO-ENTMCNC: 33.5 G/DL (ref 31.5–36.5)
MCV RBC AUTO: 87 FL (ref 78–100)
MONOCYTES # BLD AUTO: 0.5 10E3/UL (ref 0–1.3)
MONOCYTES NFR BLD AUTO: 9 %
NEUTROPHILS # BLD AUTO: 3.1 10E3/UL (ref 1.6–8.3)
NEUTROPHILS NFR BLD AUTO: 58 %
PLATELET # BLD AUTO: 232 10E3/UL (ref 150–450)
POTASSIUM SERPL-SCNC: 4 MMOL/L (ref 3.4–5.3)
RBC # BLD AUTO: 5.59 10E6/UL (ref 4.4–5.9)
SODIUM SERPL-SCNC: 139 MMOL/L (ref 136–145)
WBC # BLD AUTO: 5.3 10E3/UL (ref 4–11)

## 2023-02-26 PROCEDURE — 99204 OFFICE O/P NEW MOD 45 MIN: CPT

## 2023-02-26 PROCEDURE — 85025 COMPLETE CBC W/AUTO DIFF WBC: CPT

## 2023-02-26 PROCEDURE — 36415 COLL VENOUS BLD VENIPUNCTURE: CPT

## 2023-02-26 PROCEDURE — 80048 BASIC METABOLIC PNL TOTAL CA: CPT

## 2023-02-26 RX ORDER — OMEPRAZOLE 20 MG/1
20 TABLET, DELAYED RELEASE ORAL DAILY
Qty: 30 TABLET | Refills: 0 | Status: SHIPPED | OUTPATIENT
Start: 2023-02-26 | End: 2023-03-28

## 2023-02-26 RX ORDER — FAMOTIDINE 20 MG/1
20 TABLET, FILM COATED ORAL 2 TIMES DAILY
Qty: 60 TABLET | Refills: 0 | Status: SHIPPED | OUTPATIENT
Start: 2023-02-26 | End: 2023-03-28

## 2023-02-26 NOTE — PATIENT INSTRUCTIONS
Take the Prilosec and Pepcid as written  Return the stool sample to the lab when available  Follow-up with your primary care provider for reevaluation of healing progress with treatment.

## 2023-02-26 NOTE — PROGRESS NOTES
Patient presents with:  Nausea & Vomiting: Since Thursday, having a hard time eating and drinking       Clinical Decision Making:  Had a conversation with the patient stating that he does have symptoms of gastritis.  Use of the H2 blocker with Pepcid and PPI with Prilosec.  CBC did not show anemia.  Basic metabolic panel was within normal limits.  Patient is referred to gastroenteritis because he does not have a family practice provider and have evaluation for gastritis.  He is encouraged to completely discontinue alcohol use and avoid over-the-counter NSAIDs.  Questions were answered to his satisfaction before discharge.       ICD-10-CM    1. Acute gastritis without hemorrhage, unspecified gastritis type  K29.00 CBC with platelets and differential     Helicobacter pylori Antigen Stool     omeprazole (PRILOSEC OTC) 20 MG EC tablet     famotidine (PEPCID) 20 MG tablet     Basic metabolic panel     Helicobacter pylori Antigen Stool     Basic metabolic panel     Adult GI  Referral - Consult Only     CANCELED: Basic metabolic panel      2. Epigastric pain  R10.13 Basic metabolic panel     Basic metabolic panel          Patient Instructions   Take the Prilosec and Pepcid as written  Return the stool sample to the lab when available  Follow-up with your primary care provider for reevaluation of healing progress with treatment.          HPI:  Tommie Bernard is a 22 year old male who presents today with his girlfriend who is presenting for a 2-week history of epigastric pain with nausea and vomiting that is worsened over the last 4 days.  Patient has had early satiety and epigastric pain.  He is having difficulty with eating food or liquids he has to vomit or discontinue eating after starting to eat.  He has successfully gotten liquids and and is not dehydrated.  He is not having dysphagia or is having difficulty swallowing but it is with the eating of the food and keeping it down.  No reported hematemesis,  melanotic stools no dizziness presyncope syncope heart palpitations.  Patient does admit to having 4 alcoholic drinks on Friday and Saturday of each weekend.  Use of vaping but no caffeine intake according to the patient.  No over-the-counter NSAIDs or aspirin.  Patient is primarily been using water and Gatorade to help stay hydrated.    History obtained from chart review and the patient.    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    Vitals:    02/26/23 0924   BP: 108/72   BP Location: Right arm   Pulse: 62   Resp: 14   Temp: 97  F (36.1  C)   TempSrc: Tympanic   SpO2: 98%   Weight: 70.8 kg (156 lb 1.6 oz)       General: Patient is resting comfortably no acute distress is afebrile  HEENT: Head is normocephalic atraumatic   eyes are PERRL EOMI sclera anicteric   TMs are clear bilaterally  Throat is clear oral mucous membranes moist  No cervical lymphadenopathy present  LUNGS: Clear to auscultation bilaterally  HEART: Regular rate and rhythm  Abdomen: Nontender nondistended no rebound or guarding no masses  Mild epigastric tenderness to palpation.  No CVA tenderness to percussion  Skin: Without rash non-diaphoretic capillary refill less than 2 seconds conjunctiva without pallor    Physical Exam      Labs:  Results for orders placed or performed in visit on 02/26/23   Basic metabolic panel     Status: Abnormal   Result  Value Ref Range    Sodium 139 136 - 145 mmol/L    Potassium 4.0 3.4 - 5.3 mmol/L    Chloride 101 98 - 107 mmol/L    Carbon Dioxide (CO2) 26 22 - 29 mmol/L    Anion Gap 12 7 - 15 mmol/L    Urea Nitrogen 10.2 6.0 - 20.0 mg/dL    Creatinine 1.14 0.67 - 1.17 mg/dL    Calcium 10.1 (H) 8.6 - 10.0 mg/dL    Glucose 99 70 - 99 mg/dL    GFR Estimate >90 >60 mL/min/1.73m2   CBC with platelets and differential     Status: None   Result Value Ref Range    WBC Count 5.3 4.0 - 11.0 10e3/uL    RBC Count 5.59 4.40 - 5.90 10e6/uL    Hemoglobin 16.3 13.3 - 17.7 g/dL    Hematocrit 48.7 40.0 - 53.0 %    MCV 87 78 - 100 fL    MCH 29.2 26.5 - 33.0 pg    MCHC 33.5 31.5 - 36.5 g/dL    RDW 12.4 10.0 - 15.0 %    Platelet Count 232 150 - 450 10e3/uL    % Neutrophils 58 %    % Lymphocytes 30 %    % Monocytes 9 %    % Eosinophils 2 %    % Basophils 1 %    % Immature Granulocytes 0 %    Absolute Neutrophils 3.1 1.6 - 8.3 10e3/uL    Absolute Lymphocytes 1.6 0.8 - 5.3 10e3/uL    Absolute Monocytes 0.5 0.0 - 1.3 10e3/uL    Absolute Eosinophils 0.1 0.0 - 0.7 10e3/uL    Absolute Basophils 0.0 0.0 - 0.2 10e3/uL    Absolute Immature Granulocytes 0.0 <=0.4 10e3/uL   CBC with platelets and differential     Status: None    Narrative    The following orders were created for panel order CBC with platelets and differential.  Procedure                               Abnormality         Status                     ---------                               -----------         ------                     CBC with platelets and d...[[id number]]                      Final result                 Please view results for these tests on the individual orders.     At the end of the encounter, I discussed results, diagnosis, medications. Discussed red flags for immediate return to clinic/ER, as well as indications for follow up if no improvement. Patient understood and agreed to plan. Patient was stable for discharge.

## 2023-02-26 NOTE — LETTER
February 28, 2023      Tommie Bernard   [address] [facility] [address]        Dear ,    We are writing to inform you of your test results.    Your test results fall within the expected range(s) or remain unchanged from previous results.  Please continue with current treatment plan.    Resulted Orders   Helicobacter pylori Antigen Stool   Result Value Ref Range    Helicobacter pylori Antigen Stool Negative Negative      Comment:      Negative for Helicobacter pylori antigen by enzyme immunoassay. A negative result indicates the absence of H. pylori antigen or that the level of antigen is below the level of detection.   Basic metabolic panel   Result Value Ref Range    Sodium 139 136 - 145 mmol/L    Potassium 4.0 3.4 - 5.3 mmol/L    Chloride 101 98 - 107 mmol/L    Carbon Dioxide (CO2) 26 22 - 29 mmol/L    Anion Gap 12 7 - 15 mmol/L    Urea Nitrogen 10.2 6.0 - 20.0 mg/dL    Creatinine 1.14 0.67 - 1.17 mg/dL    Calcium 10.1 (H) 8.6 - 10.0 mg/dL    Glucose 99 70 - 99 mg/dL    GFR Estimate >90 >60 mL/min/1.73m2      Comment:      eGFR calculated using 2021 CKD-EPI equation.   CBC with platelets and differential   Result Value Ref Range    WBC Count 5.3 4.0 - 11.0 10e3/uL    RBC Count 5.59 4.40 - 5.90 10e6/uL    Hemoglobin 16.3 13.3 - 17.7 g/dL    Hematocrit 48.7 40.0 - 53.0 %    MCV 87 78 - 100 fL    MCH 29.2 26.5 - 33.0 pg    MCHC 33.5 31.5 - 36.5 g/dL    RDW 12.4 10.0 - 15.0 %    Platelet Count 232 150 - 450 10e3/uL    % Neutrophils 58 %    % Lymphocytes 30 %    % Monocytes 9 %    % Eosinophils 2 %    % Basophils 1 %    % Immature Granulocytes 0 %    Absolute Neutrophils 3.1 1.6 - 8.3 10e3/uL    Absolute Lymphocytes 1.6 0.8 - 5.3 10e3/uL    Absolute Monocytes 0.5 0.0 - 1.3 10e3/uL    Absolute Eosinophils 0.1 0.0 - 0.7 10e3/uL    Absolute Basophils 0.0 0.0 - 0.2 10e3/uL    Absolute Immature Granulocytes 0.0 <=0.4 10e3/uL       If you have any questions or concerns, please call the clinic at the number  listed above.       Sincerely,      Kenneth Chavez PA-C

## 2023-02-26 NOTE — LETTER
[facility] [place]  [address] [address]  Phone: [phone]  Fax: [phone]    February 26, 2023        Tommie Bernard   [address] [address]          To whom it may concern:    RE: Tommie Bernard    Patient was seen and treated today at our clinic and missed work.  Patient may return to work 2/28/23 with the following:  No working or lifting restrictions    Please contact me for questions or concerns.      Sincerely,        [facility]

## 2023-02-27 ENCOUNTER — APPOINTMENT (OUTPATIENT)
Dept: LAB | Facility: CLINIC | Age: 23
End: 2023-02-27
Payer: COMMERCIAL

## 2023-02-27 PROCEDURE — 87338 HPYLORI STOOL AG IA: CPT

## 2023-02-28 ENCOUNTER — TELEPHONE (OUTPATIENT)
Dept: GASTROENTEROLOGY | Facility: CLINIC | Age: 23
End: 2023-02-28
Payer: COMMERCIAL

## 2023-02-28 LAB — H PYLORI AG STL QL IA: NEGATIVE

## 2023-02-28 NOTE — TELEPHONE ENCOUNTER
Health Call Center    Phone Message    May a detailed message be left on voicemail: yes     Reason for Call: Appointment Intake    Referring Provider Name: Kenneth Chavez PA-C  Diagnosis and/or Symptoms: Acute gastritis without hemorrhage, unspecified gastritis type [K29.00]    Per triage notes, patient is to be seen as a GI Urgent, but is currently scheduled on 04/05/2023 with Vik Solano PA-C. Current appointment is outside requested time frame. Please review for further follow up per scheduling guidelines. Thanks!     Action Taken: Message routed to:  [facility] ([facility] GI    Travel Screening: Not Applicable

## 2023-03-01 NOTE — TELEPHONE ENCOUNTER
REFERRAL INFORMATION:    Referring Provider:  Kenneth Chavez PA-C    Referring Clinic:  [facility]    Reason for Visit/Diagnosis: Acute gastritis without hemorrhage, unspecified gastritis type     FUTURE VISIT INFORMATION:    Appointment Date: 4/5/2023    Appointment Time:      NOTES STATUS DETAILS   OFFICE NOTE from Referring Provider Internal 2/26/2023 UC visit with KARTIK Chavez   OFFICE NOTE from Other Specialist N/A    HOSPITAL DISCHARGE SUMMARY/  ED VISITS N/A    OPERATIVE REPORT N/A    MEDICATION LIST Internal         ENDOSCOPY  N/A    COLONOSCOPY N/A    ERCP N/A    EUS N/A    STOOL TESTING N/A    PERTINENT LABS N/A    PATHOLOGY REPORTS (RELATED) N/A    IMAGING (CT, MRI, EGD, MRCP, Small Bowel Follow Through/SBT, MR/CT Enterography) N/A

## 2023-04-05 ENCOUNTER — VIRTUAL VISIT (OUTPATIENT)
Dept: GASTROENTEROLOGY | Facility: CLINIC | Age: 23
End: 2023-04-05
Attending: PHYSICIAN ASSISTANT
Payer: COMMERCIAL

## 2023-04-05 ENCOUNTER — PRE VISIT (OUTPATIENT)
Dept: GASTROENTEROLOGY | Facility: CLINIC | Age: 23
End: 2023-04-05

## 2023-04-05 DIAGNOSIS — K29.00 ACUTE GASTRITIS WITHOUT HEMORRHAGE, UNSPECIFIED GASTRITIS TYPE: ICD-10-CM

## 2023-04-05 PROCEDURE — 99203 OFFICE O/P NEW LOW 30 MIN: CPT | Mod: VID | Performed by: PHYSICIAN ASSISTANT

## 2023-04-05 NOTE — PROGRESS NOTES
GASTROENTEROLOGY NEW PATIENT VIDEO VISIT    CC/REFERRING MD:    Júnior Saleem    REASON FOR CONSULTATION:   Kenneth Chavez for   Chief Complaint   Patient presents with     Video Visit       HISTORY OF PRESENT ILLNESS:    Tommie Bernard is a 22 year old male who is being evaluated via a billable video visit for follow-up of recent urgent care visit.  Patient went to urgent care in late February of this year with a 2-week history of epigastric pain, nausea, and vomiting that was limiting oral intake.  There has been no hematemesis, melena, or coffee-ground emesis.  He had admitted to little bit heavier alcohol use over the weekends and had been using nicotine vape, but had not been using NSAIDs or aspirin.  He had normal CBC and BMP at the urgent care visit.  It was felt that he probably had some acute gastritis and was discharged with omeprazole and Pepcid.  He did complete an H. pylori stool antigen as well, which was negative.    He notes that the omeprazole and Pepcid really seemed to resolve his symptoms.  He just finished a 30-day course of these medications last week.  As of now, he is feeling a little bit of hunger discomfort, but has otherwise had no recurrence of pain or other symptoms, such as dysphagia, odynophagia, early satiety, epigastric or abdominal pain, nausea, or vomiting.    I have reviewed and updated the patient's Past Medical History, Social History, Family History and Medication List.    Exam:    General appearance:  Healthy appearing adult, in no acute distress  Eyes:  Sclera anicteric, Pupils round and reactive to light  Ears, nose, mouth and throat:  No obvious external lesions of ears and nose.  Hearing intact  Neck:  Symmetric, No obvious external lesions  Respiratory:  Normal respiration, no use of accessory muscles   MSK:  No visual upper extremity, neck or facial muscle atrophy  ABD:  No visual abdominal distention, no audible borborygmi  Skin:  No rashes  or jaundice   Psychiatric:  Oriented to person, place and time, Appropriate mood and affect.   Neurologic:  Peripheral muscle function and dexterity appear to be intact      PERTINENT STUDIES have been reviewed.    ASSESSMENT/PLAN:    Tommie Bernard is a 22 year old male who presents for follow-up from recent urgent care visit where he was diagnosed with uncomplicated gastritis.  A 30-day course of omeprazole and Pepcid seems to have largely improved his symptoms, still has a little bit of hunger pain and discomfort.  It sounds like this gastritis could have been triggered with some excess alcohol use, he also does use nicotine vape.  We spent some time discussing gastritis and the typical triggers.  For now, I would like him to continue avoiding NSAIDs and limiting alcohol use.  We did discuss quitting tobacco products when he is ready.  His H. pylori was negative, which is reassuring.  Given that he is still a little bit symptomatic, I am going to have him return to omeprazole for the next couple of months to make sure this fully resolves.  We can check in at that time to make sure he is feeling 100% better.  If he is not feeling better or if he has a return of symptoms, then I would proceed with EGD.  He stated understanding of plan and will follow-up in a couple of months.      Video-Visit Details    Video Visit Time: 12 minutes    Type of service:  Video Visit    Originating Location (pt. Location): Home    Distant Location (provider location):  Off-site    Platform used for Video Visit: Dirk Solano PA-C    RTC 2 months    Thank you for this consultation.  It was a pleasure to participate in the care of this patient; please contact us with any further questions.  A total of 25 minutes was spent with reviewing the chart, discussing with the patient, documentation and coordination of care.    This note was created with voice recognition software, and while reviewed for accuracy, typos may remain.      Vik Solano PA-C  Division of Gastroenterology, Hepatology and Nutrition  [facility]  [phone]

## 2023-04-05 NOTE — NURSING NOTE
Is the patient currently in the state of [place]? YES    Visit mode:VIDEO    If the visit is dropped, the patient can be reconnected by: VIDEO VISIT: Text to cell phone: [phone]    Will anyone else be joining the visit? NO      How would you like to obtain your AVS? MyChart    Are changes needed to the allergy or medication list? NO    Reason for visit: No comments or concerns

## 2023-04-07 ENCOUNTER — TELEPHONE (OUTPATIENT)
Dept: GASTROENTEROLOGY | Facility: CLINIC | Age: 23
End: 2023-04-07
Payer: COMMERCIAL

## 2023-04-07 NOTE — TELEPHONE ENCOUNTER
Patient Contacted for the patient to call back and schedule the following:    Appointment type: GI Recheck  Provider: Vik Solano  Return date: 6/5/23  Specialty phone number: [phone]